# Patient Record
Sex: FEMALE | Race: WHITE | NOT HISPANIC OR LATINO | URBAN - NONMETROPOLITAN AREA
[De-identification: names, ages, dates, MRNs, and addresses within clinical notes are randomized per-mention and may not be internally consistent; named-entity substitution may affect disease eponyms.]

---

## 2020-10-06 ENCOUNTER — OFFICE VISIT (OUTPATIENT)
Dept: URBAN - NONMETROPOLITAN AREA CLINIC 2 | Facility: CLINIC | Age: 50
End: 2020-10-06
Payer: COMMERCIAL

## 2020-10-06 ENCOUNTER — LAB OUTSIDE AN ENCOUNTER (OUTPATIENT)
Dept: URBAN - NONMETROPOLITAN AREA CLINIC 2 | Facility: CLINIC | Age: 50
End: 2020-10-06

## 2020-10-06 DIAGNOSIS — Z80.0 FAMILY HISTORY OF COLON CANCER: ICD-10-CM

## 2020-10-06 PROCEDURE — 99202 OFFICE O/P NEW SF 15 MIN: CPT | Performed by: INTERNAL MEDICINE

## 2020-10-06 RX ORDER — SODIUM PICOSULFATE, MAGNESIUM OXIDE, AND ANHYDROUS CITRIC ACID 10; 3.5; 12 MG/160ML; G/160ML; G/160ML
320ML LIQUID ORAL AS DIRECTED
Qty: 320 MILLILITER | Refills: 0 | OUTPATIENT
Start: 2020-10-06 | End: 2020-10-07

## 2020-10-06 NOTE — HPI-TODAY'S VISIT:
The patient is a 50-year-old female who presents today for a screening colonoscopy.  Her mother was diagnosed with colon cancer at age 40.  She has had 2 colonoscopies in the past, and both of these have been normal.  She is overdue for her repeat colonoscopy.  She denies any problems with her bowels.  She denies any blood in her stool, constipation, or diarrhea.  Overall, she is feeling well today, and she is willing to proceed with colonoscopy.

## 2020-11-02 ENCOUNTER — OFFICE VISIT (OUTPATIENT)
Dept: URBAN - NONMETROPOLITAN AREA SURGERY CENTER 1 | Facility: SURGERY CENTER | Age: 50
End: 2020-11-02
Payer: COMMERCIAL

## 2020-11-02 DIAGNOSIS — Z80.0 FAMILY HISTORY MALIGNANT NEOPLASM OF BILIARY TRACT: ICD-10-CM

## 2020-11-02 DIAGNOSIS — Z12.11 COLON CANCER SCREENING: ICD-10-CM

## 2020-11-02 DIAGNOSIS — K63.5 BENIGN COLON POLYP: ICD-10-CM

## 2020-11-02 PROCEDURE — G9935 CANC NOT DETECTD DURING SRCN: HCPCS | Performed by: INTERNAL MEDICINE

## 2020-11-02 PROCEDURE — G8907 PT DOC NO EVENTS ON DISCHARG: HCPCS | Performed by: INTERNAL MEDICINE

## 2020-11-02 PROCEDURE — 45385 COLONOSCOPY W/LESION REMOVAL: CPT | Performed by: INTERNAL MEDICINE

## 2024-04-03 ENCOUNTER — OV NP (OUTPATIENT)
Dept: URBAN - NONMETROPOLITAN AREA CLINIC 2 | Facility: CLINIC | Age: 54
End: 2024-04-03
Payer: COMMERCIAL

## 2024-04-03 ENCOUNTER — LAB (OUTPATIENT)
Dept: URBAN - NONMETROPOLITAN AREA CLINIC 2 | Facility: CLINIC | Age: 54
End: 2024-04-03

## 2024-04-03 VITALS
HEIGHT: 68 IN | WEIGHT: 214 LBS | BODY MASS INDEX: 32.43 KG/M2 | HEART RATE: 57 BPM | TEMPERATURE: 98 F | SYSTOLIC BLOOD PRESSURE: 122 MMHG | DIASTOLIC BLOOD PRESSURE: 85 MMHG

## 2024-04-03 DIAGNOSIS — Z86.010 PERSONAL HISTORY OF COLONIC POLYPS: ICD-10-CM

## 2024-04-03 DIAGNOSIS — K21.9 GASTROESOPHAGEAL REFLUX DISEASE, UNSPECIFIED WHETHER ESOPHAGITIS PRESENT: ICD-10-CM

## 2024-04-03 DIAGNOSIS — R19.5 LOOSE STOOLS: ICD-10-CM

## 2024-04-03 DIAGNOSIS — R10.31 RLQ ABDOMINAL PAIN: ICD-10-CM

## 2024-04-03 PROBLEM — 235595009: Status: ACTIVE | Noted: 2024-04-03

## 2024-04-03 PROBLEM — 428283002: Status: ACTIVE | Noted: 2024-04-03

## 2024-04-03 PROCEDURE — 99204 OFFICE O/P NEW MOD 45 MIN: CPT | Performed by: NURSE PRACTITIONER

## 2024-04-03 RX ORDER — DICYCLOMINE HYDROCHLORIDE 10 MG/1
1 CAPSULES CAPSULE ORAL THREE TIMES A DAY
Qty: 90 CAPSULE | Refills: 3 | OUTPATIENT
Start: 2024-04-03 | End: 2024-08-01

## 2024-04-03 RX ORDER — ESTRADIOL 0.07 MG/D
PATCH, EXTENDED RELEASE TRANSDERMAL
Qty: 8 EACH | Refills: 1 | Status: ACTIVE | COMMUNITY

## 2024-04-03 RX ORDER — ESTRADIOL 0.1 MG/G
CREAM VAGINAL
Qty: 42.5 GRAM | Status: ACTIVE | COMMUNITY

## 2024-04-03 RX ORDER — PAROXETINE 20 MG/1
TABLET, FILM COATED ORAL
Qty: 30 EACH | Refills: 2 | Status: ACTIVE | COMMUNITY

## 2024-04-03 RX ORDER — OMEPRAZOLE 20 MG/1
1 CAPSULE 30 MINUTES BEFORE MORNING MEAL CAPSULE, DELAYED RELEASE ORAL ONCE A DAY
Qty: 30 | Refills: 11 | OUTPATIENT
Start: 2024-04-03

## 2024-04-03 NOTE — HPI-TODAY'S VISIT:
Ms. Cain is a pleasant 53-year-old female previously seen for family history of colon cancer and polyps who presents this time with change in bowel habit and reflux.  She states ever since she got COVID a few years ago, her reflux seems to be getting worse.  She keeps taking omeprazole for 2 weeks, symptoms resolved for about a month, and she returns with reflux and burning abdominal pain.  Recently she started having some intermittent left lower quad abdominal pain and some occasional loose stool.  She does have a grandparent with a history of esophageal cancer.  No prior EGD. Sb

## 2024-05-30 ENCOUNTER — OFFICE VISIT (OUTPATIENT)
Dept: URBAN - NONMETROPOLITAN AREA SURGERY CENTER 1 | Facility: SURGERY CENTER | Age: 54
End: 2024-05-30

## 2024-05-30 RX ORDER — ESTRADIOL 0.07 MG/D
PATCH, EXTENDED RELEASE TRANSDERMAL
Qty: 8 EACH | Refills: 1 | Status: ACTIVE | COMMUNITY

## 2024-05-30 RX ORDER — OMEPRAZOLE 20 MG/1
1 CAPSULE 30 MINUTES BEFORE MORNING MEAL CAPSULE, DELAYED RELEASE ORAL ONCE A DAY
Qty: 30 | Refills: 11 | Status: ACTIVE | COMMUNITY
Start: 2024-04-03

## 2024-05-30 RX ORDER — DICYCLOMINE HYDROCHLORIDE 10 MG/1
1 CAPSULES CAPSULE ORAL THREE TIMES A DAY
Qty: 90 CAPSULE | Refills: 3 | Status: ACTIVE | COMMUNITY
Start: 2024-04-03 | End: 2024-08-01

## 2024-05-30 RX ORDER — PAROXETINE 20 MG/1
TABLET, FILM COATED ORAL
Qty: 30 EACH | Refills: 2 | Status: ACTIVE | COMMUNITY

## 2024-05-30 RX ORDER — ESTRADIOL 0.1 MG/G
CREAM VAGINAL
Qty: 42.5 GRAM | Status: ACTIVE | COMMUNITY

## 2024-07-02 ENCOUNTER — OFFICE VISIT (OUTPATIENT)
Dept: URBAN - NONMETROPOLITAN AREA CLINIC 2 | Facility: CLINIC | Age: 54
End: 2024-07-02
Payer: COMMERCIAL

## 2024-07-02 ENCOUNTER — DASHBOARD ENCOUNTERS (OUTPATIENT)
Age: 54
End: 2024-07-02

## 2024-07-02 VITALS
HEART RATE: 65 BPM | DIASTOLIC BLOOD PRESSURE: 70 MMHG | BODY MASS INDEX: 33.65 KG/M2 | WEIGHT: 222 LBS | SYSTOLIC BLOOD PRESSURE: 109 MMHG | TEMPERATURE: 97.9 F | HEIGHT: 68 IN

## 2024-07-02 DIAGNOSIS — Z86.010 PERSONAL HISTORY OF COLONIC POLYPS: ICD-10-CM

## 2024-07-02 DIAGNOSIS — R19.5 LOOSE STOOLS: ICD-10-CM

## 2024-07-02 DIAGNOSIS — R10.31 RLQ ABDOMINAL PAIN: ICD-10-CM

## 2024-07-02 DIAGNOSIS — K21.9 GASTROESOPHAGEAL REFLUX DISEASE, UNSPECIFIED WHETHER ESOPHAGITIS PRESENT: ICD-10-CM

## 2024-07-02 PROCEDURE — 99214 OFFICE O/P EST MOD 30 MIN: CPT | Performed by: NURSE PRACTITIONER

## 2024-07-02 RX ORDER — OMEPRAZOLE 20 MG/1
1 CAPSULE 30 MINUTES BEFORE MORNING MEAL CAPSULE, DELAYED RELEASE ORAL ONCE A DAY
Qty: 30 | Refills: 11 | OUTPATIENT

## 2024-07-02 RX ORDER — PAROXETINE 20 MG/1
TABLET, FILM COATED ORAL
Qty: 30 EACH | Refills: 2 | Status: ACTIVE | COMMUNITY

## 2024-07-02 RX ORDER — ESTRADIOL 0.1 MG/G
CREAM VAGINAL
Qty: 42.5 GRAM | Status: ACTIVE | COMMUNITY

## 2024-07-02 RX ORDER — OMEPRAZOLE 20 MG/1
1 CAPSULE 30 MINUTES BEFORE MORNING MEAL CAPSULE, DELAYED RELEASE ORAL ONCE A DAY
Qty: 30 | Refills: 11 | Status: ACTIVE | COMMUNITY
Start: 2024-04-03

## 2024-07-02 RX ORDER — DICYCLOMINE HYDROCHLORIDE 10 MG/1
1 CAPSULES CAPSULE ORAL THREE TIMES A DAY
Qty: 90 CAPSULE | Refills: 3 | OUTPATIENT

## 2024-07-02 RX ORDER — ESTRADIOL 0.07 MG/D
PATCH, EXTENDED RELEASE TRANSDERMAL
Qty: 8 EACH | Refills: 1 | Status: ACTIVE | COMMUNITY

## 2024-07-02 RX ORDER — DICYCLOMINE HYDROCHLORIDE 10 MG/1
1 CAPSULES CAPSULE ORAL THREE TIMES A DAY
Qty: 90 CAPSULE | Refills: 3 | Status: ACTIVE | COMMUNITY
Start: 2024-04-03 | End: 2024-08-01

## 2024-07-02 NOTE — HPI-TODAY'S VISIT:
Ms. Cain is a pleasant 53-year-old female previously seen for family history of colon cancer and polyps who presents this time with change in bowel habit and reflux.  She states ever since she got COVID a few years ago, her reflux seems to be getting worse.  She keeps taking omeprazole for 2 weeks, symptoms resolved for about a month, and she returns with reflux and burning abdominal pain.  Recently she started having some intermittent left lower quad abdominal pain and some occasional loose stool.  She does have a grandparent with a history of esophageal cancer.  No prior EGD. Sb  gpp neg and egd with gastritis on bx 2024.   today, all gi symptoms resolved.

## 2025-06-03 ENCOUNTER — OFFICE VISIT (OUTPATIENT)
Age: 55
End: 2025-06-03